# Patient Record
Sex: FEMALE | Race: BLACK OR AFRICAN AMERICAN | NOT HISPANIC OR LATINO | URBAN - METROPOLITAN AREA
[De-identification: names, ages, dates, MRNs, and addresses within clinical notes are randomized per-mention and may not be internally consistent; named-entity substitution may affect disease eponyms.]

---

## 2024-08-12 ENCOUNTER — OFFICE (OUTPATIENT)
Dept: URBAN - METROPOLITAN AREA CLINIC 19 | Facility: CLINIC | Age: 19
End: 2024-08-12
Payer: COMMERCIAL

## 2024-08-12 VITALS
HEART RATE: 78 BPM | DIASTOLIC BLOOD PRESSURE: 85 MMHG | WEIGHT: 149 LBS | OXYGEN SATURATION: 100 % | HEIGHT: 61 IN | SYSTOLIC BLOOD PRESSURE: 125 MMHG

## 2024-08-12 DIAGNOSIS — Z80.0 FAMILY HISTORY OF MALIGNANT NEOPLASM OF DIGESTIVE ORGANS: ICD-10-CM

## 2024-08-12 DIAGNOSIS — R19.4 CHANGE IN BOWEL HABIT: ICD-10-CM

## 2024-08-12 DIAGNOSIS — Z83.719 FAMILY HISTORY OF COLON POLYPS, UNSPECIFIED: ICD-10-CM

## 2024-08-12 DIAGNOSIS — K62.5 HEMORRHAGE OF ANUS AND RECTUM: ICD-10-CM

## 2024-08-12 PROCEDURE — 99204 OFFICE O/P NEW MOD 45 MIN: CPT

## 2024-08-12 RX ORDER — HYDROCORTISONE ACETATE 25 MG/1
SUPPOSITORY RECTAL
Qty: 10 | Refills: 1 | Status: ACTIVE
Start: 2024-08-12

## 2024-08-12 NOTE — SERVICENOTES
The patient's assessment was reviewed with Blaine Bhatia MD and a collaborative plan of care was established.

## 2024-08-12 NOTE — SERVICEHPINOTES
19-year-old single black female here today with her father for evaluation of a positive FOBT 7/31/24 and 1 month of intermittent rectal bleeding with bowel movements. She reports recent constipation with straining to have a bowel movement. Hbg (fingerstick) 7/31/24 was normal at 12.7. She denies dysphagia, GERD, nausea, or abdominal pain. She currently takes no GI medications.
br
br  She denies any previous endoscopic GI studies. Her father had colon polyps at age 42. Her paternal grandfather had colon cancer at age 45.